# Patient Record
Sex: FEMALE | Race: ASIAN
[De-identification: names, ages, dates, MRNs, and addresses within clinical notes are randomized per-mention and may not be internally consistent; named-entity substitution may affect disease eponyms.]

---

## 2017-11-09 ENCOUNTER — HOSPITAL ENCOUNTER (EMERGENCY)
Dept: HOSPITAL 76 - ED | Age: 52
Discharge: HOME | End: 2017-11-09
Payer: COMMERCIAL

## 2017-11-09 VITALS — DIASTOLIC BLOOD PRESSURE: 89 MMHG | SYSTOLIC BLOOD PRESSURE: 135 MMHG
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
## 2017-11-09 DIAGNOSIS — S20.211A: Primary | ICD-10-CM

## 2017-11-09 DIAGNOSIS — E03.9: ICD-10-CM
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
## 2017-11-09 PROCEDURE — 99284 EMERGENCY DEPT VISIT MOD MDM: CPT

## 2017-11-09 PROCEDURE — 93005 ELECTROCARDIOGRAM TRACING: CPT

## 2017-11-09 PROCEDURE — 85025 COMPLETE CBC W/AUTO DIFF WBC: CPT

## 2017-11-09 PROCEDURE — 83690 ASSAY OF LIPASE: CPT

## 2017-11-09 PROCEDURE — 99283 EMERGENCY DEPT VISIT LOW MDM: CPT

## 2017-11-09 PROCEDURE — 80053 COMPREHEN METABOLIC PANEL: CPT

## 2017-11-09 NOTE — ED PHYSICIAN DOCUMENTATION
PD HPI TRUNK INJURY





- Stated complaint


Stated Complaint: RT SIDE PX





- Chief complaint


Chief Complaint: Abd Pain





- History obtained from


History obtained from: Patient





- History of Present Illness


Location: Right chest


Type of injury: Fall


Timing - onset: Yesterday


Quality: Pain


Worsened by: Moving, Palpating, Other (Deep inspiration.)


Associated symtptoms: No: Weakness, Numbness, Syncope


Where injury occured: Home


Similar symptoms before: Has not had sx before





- Additional information


Additional information: 





The patient is a 52-year-old female who presents with right-sided chest pain 

that started yesterday after she fell while cleaning her bathtub at home.  She 

impacted her right lower chest wall on the edge of the tub.  Her pain is worse 

with deep inspiration or with movement of her right upper extremity.  She 

denies cough, abdominal pain, nausea or vomiting.  She denies history of 

similar symptoms in the past, and denies any other current injuries.





Review of Systems


Constitutional: denies: Fever


Ears: denies: Tinnitus/ringing


Nose: denies: Congestion


Cardiac: reports: Chest pain / pressure.  denies: Palpitations


Respiratory: denies: Dyspnea, Cough


GI: denies: Abdominal Pain, Nausea, Vomiting


: denies: Dysuria


Skin: denies: Rash, Abrasion (s)


Musculoskeletal: denies: Neck pain, Back pain, Extremity pain


Neurologic: denies: Focal weakness, Numbness, Syncope, Headache





PD PAST MEDICAL HISTORY





- Past Medical History


Past Medical History: Yes


Endocrine/Autoimmune: HyPOthyroidism





- Past Surgical History


Past Surgical History: Yes


/GYN: Tubal ligation





- Present Medications


Home Medications: 


 Ambulatory Orders











 Medication  Instructions  Recorded  Confirmed


 


HYDROcod/ACETAM 5/325 [Vicodin 1 - 2 ea PO Q6H PRN #15 tablet 11/09/17 





5/325]   


 


Levothyroxine Sodium [Synthroid] 175 mcg PO DAILY 11/09/17 11/09/17














- Allergies


Allergies/Adverse Reactions: 


 Allergies











Allergy/AdvReac Type Severity Reaction Status Date / Time


 


aspirin Allergy  Hives Verified 11/09/17 08:48














- Living Situation


Living Situation: reports: With spouse/s.o.





- Social History


Does the pt smoke?: No


Smoking Status: Never smoker


Does the pt drink ETOH?: Yes





- POLST


Patient has POLST: No





PD ED PE NORMAL





- Vitals


Vital signs reviewed: Yes (Initially hypertensive.)





- General


General: Alert and oriented X 3, Well developed/nourished





- HEENT


HEENT: Atraumatic, EOMI, Pharynx benign





- Neck


Neck: No bony TTP, No JVD





- Cardiac


Cardiac: RRR, No murmur





- Respiratory


Respiratory: No respiratory distress, Clear bilaterally, Other (Tenderness to 

palpation of the right lower chest wall, without ecchymosis or abrasions.)





- Abdomen


Abdomen: Soft, Non tender





- Back


Back: No CVA TTP, No spinal TTP





- Derm


Derm: No rash





- Extremities


Extremities: No tenderness to palpate, No edema, No calf tenderness / cord





- Neuro


Neuro: Alert and oriented X 3, No motor deficit, Normal speech





Results





- Vitals


Vitals: 


 Oxygen











O2 Source                      Room air

















- EKG (time done)


  ** 08:47


Rate: Rate (enter#) (71)


Rhythm: NSR


Axis: Normal


Intervals: Normal [place]


Ischemia: Other (RSR" in V2, consistent with RVH.)


Compare to prior EKG: Old EKG unavailable


Computer interpretation: Agree with computer





- Rads (name of study)


  ** Chest with right ribs


Radiology: Prelim report reviewed, EMP read contemporaneously, See rad report (

Negative chest and right rib radiography.)





PD MEDICAL DECISION MAKING





- ED course


Complexity details: reviewed results, re-evaluated patient, considered 

differential, d/w patient, d/w family


ED course: 


The patient's presentation is most consistent with contusion to the right lower 

chest wall secondary to falling against the edge of the bathtub.  Chest x-ray 

with right rib detail reveals no radiographic evidence of rib fracture or 

pulmonary contusion.  I discussed with her the expected course of injury, 

symptomatic treatment and outpatient follow-up, as well as potentially 

worrisome signs or symptoms that should prompt reevaluation in the emergency 

department.  She is being discharged with prescription for Vicodin, 15 tablets.








Departure





- Departure


Disposition: 01 Home, Self Care


Clinical Impression: 


Chest wall contusion


Qualifiers:


 Encounter type: initial encounter Laterality: right Qualified Code(s): 

S20.211A - Contusion of right front wall of thorax, initial encounter





Condition: Stable


Instructions:  ED Contusion Chest Wall


Follow-Up: 


[facility] [place] [Provider Group]


Prescriptions: 


HYDROcod/ACETAM 5/325 [Vicodin 5/325] 1 - 2 ea PO Q6H PRN #15 tablet


 PRN Reason: Pain


Comments: 


You can use ibuprofen, up to 800 mg 3 times daily for its anti-inflammatory 

effect.


You can use Vicodin as prescribed if needed for pain.


Let pain be your guide to activity level.


Follow up with your primary physician within 2 weeks if not markedly improved.


Return to the emergency department if you develop increasing difficulty 

breathing, or otherwise worsening symptoms.


Discharge Date/Time: 11/09/17 10:28

## 2017-11-09 NOTE — XRAY REPORT
EXAM:

RIGHT RIB RADIOGRAPHY

 

EXAM DATE: 11/9/2017 09:27 AM.

 

CLINICAL HISTORY: Right lower chest wall pain after fall against tub.

 

COMPARISON: None.

 

TECHNIQUE: 1 view of the chest and 2 views of the ribs.

 

FINDINGS: 

Bones:  no fracture or bone lesion.

 

Lungs: No focal opacities. No pneumothorax. No pleural effusions.

 

Mediastinum: Heart and mediastinal contours are unremarkable.

 

 

IMPRESSION: Negative chest and right rib radiography.

 

[facility]

Referring Provider Line: [phone]

 

SITE ID: [id number]

## 2017-11-09 NOTE — XRAY PRELIMINARY REPORT
Accession: [id number]

Exam: XR RIBS W/PA CHEST RT

 

IMPRESSION: Negative chest and right rib radiography.

 

[facility]

 

SITE ID: [id number]

## 2022-02-17 ENCOUNTER — HOSPITAL ENCOUNTER (OUTPATIENT)
Dept: HOSPITAL 76 - DI | Age: 57
Discharge: HOME | End: 2022-02-17
Attending: STUDENT IN AN ORGANIZED HEALTH CARE EDUCATION/TRAINING PROGRAM
Payer: COMMERCIAL

## 2022-02-17 DIAGNOSIS — M17.12: Primary | ICD-10-CM

## 2022-02-17 DIAGNOSIS — M25.862: ICD-10-CM

## 2022-02-17 DIAGNOSIS — R60.0: ICD-10-CM

## 2022-02-17 DIAGNOSIS — M25.462: ICD-10-CM

## 2022-02-17 NOTE — MRI REPORT
PROCEDURE:  Knee LT W/O

 

INDICATIONS:  KNEE PAIN

 

TECHNIQUE:  

Noncontrast sagittal PD fast spin echo and T2 fast spin echo with fat saturation, sagittal 3-D gradie
nt sequence with fat saturation; coronal T1 spin echo and PD fast spin echo with fat saturation, and 
axial PD fast spin echo with fat saturation through the knee.  

 

COMPARISON:  None.

 

Findings:

 

Medial meniscus: No surface communication/tear. Deficiency of the anterior horn, which may reflect pr
ior meniscectomy.

 

Lateral meniscus: No surface communication/tear.

 

LIGAMENTS/TENDONS:

 

Patellar tendon: Intact.  

 

Distal quadriceps tendon: Intact.

 

Hoffa's fat pad: No evidence of fibrosis or mass.

 

PCL: Intact.

 

ACL: Intact.

 

Lateral collateral ligament complex: No significant abnormality.

 

Posterolateral corner: T2 hyperintense signal about the popliteus musculotendinous junction, which ma
y reflect strain injury.

 

Medial collateral ligament: Periligamentous edema, compatible grade 1/2 injury. A 1.5 x 1.7 x 0.6 cm 
T2 hyperintense lesion is seen adjacent to the MCL, which may reflect a ganglion.

 

MARROW:  Subchondral T2 hyperintense foci are seen, measuring up to 5.6 mm, compatible with fibrocyst
ic change.

Faint T2 hyperintense signal is seen within the inner femoral condyle and superior to the intercondyl
ar region, likely reflecting contusion.

 

CARTILAGE: Deficiency of the medial compartment hyaline cartilage overlying the medial femoral condyl
e. 7 mm defect in the hyaline cartilage overlying the medial patella facet. Thinning and signal heter
ogeneity of the lateral compartment hyaline cartilage.

 

Muscles: No significant edema or atrophy.  

 

Joint effusion/Baker's cyst: Moderate joint effusion. No substantial Baker's cyst.

 

Subcutaneous soft tissues: Prepatellar soft tissue edema. A 0.7 x 1.3 x 1 cm T2 hyperintense lesion i
s seen posterior to the medial tibial plateau with septations, likely reflecting a ganglion.

 

IMPRESSION: 

1. Deficiency of the anterior horn, medial meniscus, which may reflect prior meniscectomy.

2. Grade 1/2 MCL injury.

3. Edema at the popliteus musculotendinous junction, which may reflect strain injury.

4. Faint edematous signal in the distal femur as detailed above, likely reflecting contusion.

5. Tricompartment degenerative change of the hyaline cartilage as detailed above.

6. Moderate joint effusion.

7. 2 cystic appearing lesions with septations adjacent to the MCL and medial tibial plateau, which ma
y reflect ganglions.

 

 

Reviewed by: Bar Price MD on 2/17/2022 1:43 PM PST

Approved by: Bar Price MD on 2/17/2022 1:43 PM PST

 

 

Station ID:  [id number]

## 2022-07-05 ENCOUNTER — HOSPITAL ENCOUNTER (OUTPATIENT)
Dept: HOSPITAL 76 - DI.WOS | Age: 57
Discharge: HOME | End: 2022-07-05
Payer: COMMERCIAL

## 2022-07-05 DIAGNOSIS — M17.0: Primary | ICD-10-CM

## 2022-07-05 DIAGNOSIS — M25.462: ICD-10-CM

## 2022-07-05 NOTE — XRAY REPORT
PROCEDURE:  Knee 3 View LT

 

INDICATIONS:  KNEE PX

 

TECHNIQUE:  3 views of the left knee(s) were acquired.  

 

COMPARISON:  X-ray left knee, 4 views, 10/05/2010 21.

 

FINDINGS:  

 

Bones:  No fractures or dislocations.  No suspicious bony lesions.  Moderate degenerative joint disea
se bilaterally.

 

Soft tissues: Small left joint effusion.  No suspicious soft tissue calcifications.  

 

IMPRESSION:  

 

1. Moderate degenerative joint disease bilaterally.

 

2. Small left knee joint effusion.

 

Reviewed by: Josee Monsivais MD on 7/5/2022 4:55 PM PDT

Approved by: Josee Monsivais MD on 7/5/2022 4:55 PM PDT

 

 

Station ID:  [id number]